# Patient Record
Sex: FEMALE | Race: BLACK OR AFRICAN AMERICAN | NOT HISPANIC OR LATINO | Employment: OTHER | URBAN - METROPOLITAN AREA
[De-identification: names, ages, dates, MRNs, and addresses within clinical notes are randomized per-mention and may not be internally consistent; named-entity substitution may affect disease eponyms.]

---

## 2023-04-24 ENCOUNTER — OFFICE VISIT (OUTPATIENT)
Dept: ENDOCRINOLOGY | Facility: CLINIC | Age: 33
End: 2023-04-24
Payer: COMMERCIAL

## 2023-04-24 VITALS
HEART RATE: 102 BPM | DIASTOLIC BLOOD PRESSURE: 70 MMHG | SYSTOLIC BLOOD PRESSURE: 120 MMHG | HEIGHT: 67 IN | OXYGEN SATURATION: 98 % | BODY MASS INDEX: 24.17 KG/M2 | WEIGHT: 154 LBS

## 2023-04-24 DIAGNOSIS — E05.90 HYPERTHYROIDISM: Primary | ICD-10-CM

## 2023-04-24 PROCEDURE — 1159F MED LIST DOCD IN RCRD: CPT | Performed by: INTERNAL MEDICINE

## 2023-04-24 PROCEDURE — 99203 OFFICE O/P NEW LOW 30 MIN: CPT | Performed by: INTERNAL MEDICINE

## 2023-04-24 PROCEDURE — 1160F RVW MEDS BY RX/DR IN RCRD: CPT | Performed by: INTERNAL MEDICINE

## 2023-04-24 RX ORDER — SWAB
SWAB, NON-MEDICATED MISCELLANEOUS
COMMUNITY
Start: 2023-03-13

## 2023-04-24 RX ORDER — PROPYLTHIOURACIL 50 MG/1
50 TABLET ORAL 2 TIMES DAILY
COMMUNITY
Start: 2023-04-14

## 2023-04-24 NOTE — PROGRESS NOTES
Chief Complaint   Patient presents with   • Hyperthyroidism        Referring Provider  Ayana PARDO   Elinor Dupont is a 33 y.o. female had concerns including Hyperthyroidism.     Hyperthyroidism was diagnosed a few years and was initially on medication for about a year. She stopped taking due to frequency of visits and lab draws and when she stopped taking she felt ok. Was off the med for about a year and a half before she became pregnant (is currently 13.1 weeks pregnant).     She was started on PTU about 6 weeks ago - 50 mg daily. About a week or so ago her dose was increased to 50 mg twice daily.     She felt bad in early pregnancy - fatigue and nausea. Has had vomiting as well, none in the last three days.     She has had palpitations/heart racing, feels a little air hunger. This started very early in pregnancy.   Is generally only taking the PTU once a day because she has such nausea after taking any meds - due to pregnancy.     She has phenergan and has been advised to try B6/unisom combo for nausea. Hasn't tried the OTC treatment yet.     Past Medical History:   Diagnosis Date   • Hyperthyroidism      History reviewed. No pertinent surgical history.   Family History   Problem Relation Age of Onset   • Glaucoma Mother    • Kidney disease Father    • Heart disease Father    • Gout Father    • Asthma Father    • Gout Brother    • Diabetes Brother       Social History     Socioeconomic History   • Marital status:    Tobacco Use   • Smoking status: Never   • Smokeless tobacco: Never   Substance and Sexual Activity   • Alcohol use: Never   • Drug use: Never   • Sexual activity: Defer      No Known Allergies   Current Outpatient Medications on File Prior to Visit   Medication Sig Dispense Refill   • Prenatal 28-0.8 MG tablet      • propylthiouracil (PTU) 50 MG tablet Take 1 tablet by mouth 2 (Two) Times a Day.       No current facility-administered medications on file prior to visit.        Review  "of Systems   Constitutional: Positive for activity change, appetite change, fatigue and unexpected weight loss.   HENT: Negative.    Eyes: Negative.    Respiratory: Positive for shortness of breath.    Cardiovascular: Positive for palpitations.   Gastrointestinal: Positive for nausea and vomiting.   Endocrine: Negative.    Genitourinary: Negative.    Musculoskeletal: Positive for back pain.   Skin: Negative.    Allergic/Immunologic: Positive for environmental allergies.   Neurological: Positive for headache.   Hematological: Negative.    Psychiatric/Behavioral: Negative.         /70   Pulse 102   Ht 170.2 cm (67\")   Wt 69.9 kg (154 lb)   SpO2 98%   BMI 24.12 kg/m²      Physical Exam    Constitutional:  well developed; well nourished  no acute distress   ENT/Thyroid: no thyromegaly  no palpable nodules   Eyes: EOM intact  Conjunctiva: clear   Respiratory:  breathing is unlabored  clear to auscultation bilaterally   Cardiovascular:  regular rate and rhythm, S1, S2 normal, no murmur, click, rub or gallop   Chest:  Not performed.   Abdomen: Not performed.   : Not performed.   Musculoskeletal: negative findings:  ROM of all joints is normal, no deformities present   Skin: dry and warm   Neuro: normal without focal findings and mental status, speech normal, alert and oriented x3   Psych: oriented to time, place and person, mood and affect are within normal limits     Labs/Imaging    3/13/2023 TSH less than 0.005, free T4 3.22 with upper limit 1.77      Assessment and Plan    Diagnoses and all orders for this visit:    1. Hyperthyroidism (Primary)  Diagnosed years ago.  Suspect Graves' disease.  She was on methimazole for about a year then stopped taking.  Was off for a year and a half until she became pregnant and was diagnosed with recurrent hyperthyroidism.  She is currently on PTU 50 mg that she takes most days only once per day.  Is advised to take twice daily but has difficulty taking due to " nausea/vomiting related to pregnancy.  Encouraged twice daily dosing.  Consider transition to methimazole for once daily dosing.  Check labs with next draw in a few days including thyroid antibodies.  We will titrate methimazole/PTU as needed to get TFTs into an normal range as quickly as possible due to current pregnancy.  -     TSH; Future  -     T4, Free; Future  -     Thyroid Stimulating Immunoglobulin; Future  -     Thyrotropin Receptor Antibody; Future         Return in about 6 weeks (around 6/5/2023) for next scheduled follow up 6-8 weeks. The patient was instructed to contact the clinic with any interval questions or concerns.    Yulia Larose, DO   Endocrinologist    Please note that portions of this note were completed with a voice recognition program.

## 2023-05-02 ENCOUNTER — TELEPHONE (OUTPATIENT)
Dept: ENDOCRINOLOGY | Facility: CLINIC | Age: 33
End: 2023-05-02
Payer: COMMERCIAL

## 2023-05-02 NOTE — TELEPHONE ENCOUNTER
----- Message from Yulia Larose DO sent at 5/1/2023  2:01 PM EDT -----        ----- Message -----  From: Mariajose Andrews  Sent: 5/1/2023  11:55 AM EDT  To: Yulia Larose DO

## 2023-06-07 ENCOUNTER — TELEMEDICINE (OUTPATIENT)
Dept: ENDOCRINOLOGY | Facility: CLINIC | Age: 33
End: 2023-06-07
Payer: COMMERCIAL

## 2023-06-07 DIAGNOSIS — E05.90 HYPERTHYROIDISM: Primary | ICD-10-CM

## 2023-06-07 PROCEDURE — 99213 OFFICE O/P EST LOW 20 MIN: CPT | Performed by: INTERNAL MEDICINE

## 2023-06-07 NOTE — PROGRESS NOTES
Chief Complaint   Patient presents with    Hyperthyroidism        HPI   Elinor Dupont is a 33 y.o. female had concerns including Hyperthyroidism.      Visit was conducted via telemedicine. Consent was obtained from the patient to conduct a video visit.    She is now taking PTU 50 mg twice daily as directed. Is feeling much better. Back to pre-pregnancy levels.     Is due for labs.     The following portions of the patient's history were reviewed and updated as appropriate: allergies, current medications, past family history, past medical history, past social history, past surgical history, and problem list.    Review of Systems   Constitutional: Negative.    Endocrine: Negative.       There were no vitals taken for this visit.     Constitutional:  no acute distress   ENT/Thyroid: Not examined    Eyes: Not examined    Respiratory:  No conversational dyspnea    Cardiovascular:  Not performed.   Chest:  Not performed.   Abdomen: Not performed.   : Not performed.   Musculoskeletal: Not examined   Skin: not performed.   Neuro: mental status, speech normal, alert and oriented x3   Psych: oriented to time, place and person, mood and affect are within normal limits     LABS AND IMAGING    4/27/23 TSHrAb 9.13 (upper limit 1.75), TSI 1.33 (UL 0.55), TSH < 0.005, free T4 3.22    Assessment and Plan    Diagnoses and all orders for this visit:    1. Hyperthyroidism (Primary)  Due to Graves disease. Is 19.3 weeks pregnant. Taking PTU 50 mg twice daily and feeling much better. Repeat TFTs now. Transition to methimazole for once daily dosing pending lab results.   Then repeat labs in a month - prior to next follow-up. Will mail the patient the lab orders.   -     T4, Free; Future  -     TSH; Future  -     Comprehensive Metabolic Panel; Future         Return in about 1 month (around 7/7/2023) for next scheduled follow up. The patient was instructed to contact the clinic with any interval questions or concerns.    Yulia Larose,  DO   Endocrinologist    Please note that portions of this document were completed with a voice recognition program. Efforts were made to edit the dictations, but occasionally words are mis-transcribed.

## 2023-06-19 DIAGNOSIS — E05.90 HYPERTHYROIDISM: Primary | ICD-10-CM

## 2023-06-19 RX ORDER — METHIMAZOLE 5 MG/1
7.5 TABLET ORAL DAILY
Qty: 45 TABLET | Refills: 3 | Status: SHIPPED | OUTPATIENT
Start: 2023-06-19 | End: 2024-06-18

## 2023-09-13 ENCOUNTER — TELEPHONE (OUTPATIENT)
Dept: ENDOCRINOLOGY | Facility: CLINIC | Age: 33
End: 2023-09-13
Payer: COMMERCIAL

## 2023-09-14 DIAGNOSIS — E05.90 HYPERTHYROIDISM: Primary | ICD-10-CM

## 2023-09-14 NOTE — TELEPHONE ENCOUNTER
Called and spoke with patient. [facility] knows to have labs drawn before. She will call us back with fax number to fax her lab orders.

## 2023-09-27 ENCOUNTER — TELEMEDICINE (OUTPATIENT)
Dept: ENDOCRINOLOGY | Facility: CLINIC | Age: 33
End: 2023-09-27
Payer: COMMERCIAL

## 2023-09-27 DIAGNOSIS — E05.90 HYPERTHYROIDISM: ICD-10-CM

## 2023-09-27 PROCEDURE — 99213 OFFICE O/P EST LOW 20 MIN: CPT | Performed by: INTERNAL MEDICINE

## 2023-09-27 RX ORDER — METHIMAZOLE 5 MG/1
7.5 TABLET ORAL DAILY
Qty: 45 TABLET | Refills: 5 | Status: SHIPPED | OUTPATIENT
Start: 2023-09-27 | End: 2024-09-26

## 2023-09-27 NOTE — PROGRESS NOTES
Chief Complaint   Patient presents with    Graves' Disease        HPI   Elinor Dupont is a 33 y.o. female had concerns including Graves' Disease.      Visit was conducted via telemedicine. Consent was obtained from the patient to conduct a video visit.    Is 35.3 weeks with a girl, baby was breech last week. Is measuring > 90%.     On methimazole 7.5 mg daily. Tolerating without issue. TFTS updated last week. T4 in range.   She notes some fatigue due to third trimester pregnancy but otherwise is feeling ok.     The following portions of the patient's history were reviewed and updated as appropriate: allergies, current medications, past family history, past medical history, past social history, past surgical history, and problem list.    Review of Systems   Constitutional:  Positive for fatigue.      There were no vitals taken for this visit.     Constitutional:  no acute distress   ENT/Thyroid: Not examined    Eyes: Not examined    Respiratory:  No conversational dyspnea    Cardiovascular:  Not performed.   Chest:  Not performed.   Abdomen: Not performed.   : Not performed.   Musculoskeletal: Not examined   Skin: not performed.   Neuro: mental status, speech normal, alert and oriented x3   Psych: oriented to time, place and person, mood and affect are within normal limits     LABS AND IMAGING    4/27/23 TSHrAb 9.13 (upper limit 1.75), TSI 1.33 (UL 0.55), TSH < 0.005, free T4 3.22    6/13/23 TSH <0.005, free T4 1.6 on PTU 50 mg BID    9/21/23 TSH < 0.005, free T4 1.33 on methimazole 7.5 mg daily      Assessment and Plan    Diagnoses and all orders for this visit:    1. Hyperthyroidism  Diagnosed 3-5 years ago. Variably taking meds in the past. 35 weeks pregnant and is on methimazole 7.5 mg daily. TFTs recently in target range, Free T4 mid normal. TSH still suppressed. No change at this time. Refill sent. Stay on methimazole 7.5 mg daily for now and after delivery. Check labs in 2-3 months. Orders will be mailed.    -     methIMAzole (TAPAZOLE) 5 MG tablet; Take 1.5 tablets by mouth Daily.  Dispense: 45 tablet; Refill: 5  -     T4, Free; Future  -     TSH; Future         Return in about 5 months (around 2/27/2024) for next scheduled follow up. The patient was instructed to contact the clinic with any interval questions or concerns.    Yulia Larose, DO   Endocrinologist    Please note that portions of this document were completed with a voice recognition program. Efforts were made to edit the dictations, but occasionally words are mis-transcribed.